# Patient Record
Sex: FEMALE | URBAN - METROPOLITAN AREA
[De-identification: names, ages, dates, MRNs, and addresses within clinical notes are randomized per-mention and may not be internally consistent; named-entity substitution may affect disease eponyms.]

---

## 2022-08-11 ENCOUNTER — OFFICE VISIT (OUTPATIENT)
Dept: URBAN - METROPOLITAN AREA CLINIC 63 | Facility: CLINIC | Age: 47
End: 2022-08-11

## 2022-08-30 ENCOUNTER — DASHBOARD ENCOUNTERS (OUTPATIENT)
Age: 47
End: 2022-08-30

## 2022-08-30 PROBLEM — 428283002: Status: ACTIVE | Noted: 2022-08-30

## 2022-08-31 ENCOUNTER — OFFICE VISIT (OUTPATIENT)
Dept: URBAN - METROPOLITAN AREA CLINIC 63 | Facility: CLINIC | Age: 47
End: 2022-08-31

## 2022-08-31 NOTE — HPI-TODAY'S VISIT:
Stephanie is a pleasant 46-year-old female who presents today for evaluation of alternating bowel habits.  Family history of colon polyps  EGD/colonoscopy in August 2019 demonstrated a moderate size hiatal hernia.  Irregular Z-line at 36 cm.  Mild erythema in the gastric antrum.  Duodenum unremarkable.  2 small polyps approximately 3 mm in size removed from the cecum.  3 mm polyp removed from the hepatic flexure.  4 mm polyp removed from the mid transverse colon.  5 to 6 mm polyp removed from the mid transverse colon.  5 mm polyp removed 30 cm from the anal verge.  Diverticulosis.  Chronic gastritis positive for H. pylori.  Benign lower third esophageal biopsies.  All polyps consistent with hyperplastic polyps except for colon polyp at 30 cm mostly acellular debris with degenerating cells and no tissue present for evaluation.